# Patient Record
Sex: MALE | Race: WHITE | Employment: FULL TIME | URBAN - METROPOLITAN AREA
[De-identification: names, ages, dates, MRNs, and addresses within clinical notes are randomized per-mention and may not be internally consistent; named-entity substitution may affect disease eponyms.]

---

## 2023-07-18 NOTE — PROGRESS NOTES
Subjective   Patient ID: Jose Richards is a 32 y.o. male who presents for Annual Exam.  Today he is accompanied by alone.     HPI    1. Health maintenance   Up to date on recommended vaccinations  Denies smoking history and hasn't consumed alcohol in over 1 year  Claims to have a well balanced diet and eats more home-cooked meals   Attempts to run for 15 minutes daily and frequently walks weekly   Denies family history of colon cancer or prostate cancer   Denies chest pain, shortness of breath, palpitations, syncope or other cardiopulmonary symptoms   Denies abdominal pain, N/V/D/C, hematochezia, numbness, tingling, myalgia, arthralgia, polydipsia, heat/cold intolerance, polyuria and dysphagia     2. Mood disorder  Mood swings, primarily occurring in winter months, have improved since taking OTC Vitamin D.    Last Vitamin D level improving from 34 to 64 on (6/1/2022)     3. Deviated septum  Stable with pt seeing ENT who deffered surgical intervention at this time.     4. Nevus /birthmark  Pt seen by Derm (Dr. 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   Derm recommending monthly self-skin checks, notifying office for any changes in size, shape, or color.       5. Upper back pain  Continues to have upper back lower neck pain a couple times a month well managed with stretching and exercise.    Evaluated by by Orthopedics (Dr. Lay) on 12/6/2022 who recommended conservative treatment of stretching and regular exercise.   Denies numbness or tingling, changes in bladder habits, or difficulty walking.      6. Cold hands and feet  Stable and related to working in his basement which is colder.   Denies having any skin color changes or pain associated with the cold feet and hands. He is not a smoker. This only occurs during the winter months.     7. Acid reflux  Improved since making dietary and other lifestyle changes with pt no longer having acid reflex.   Endorses occasional, mild bloating associated  "with late night snacking.   Denies belching, nausea, or vomiting.     8. Other hemorrhoids   Twice in the past 1 month, he reports a \"sandpaper\" and \"like there is still something there.\"   Associated small amount of bright red blood on tissue paper upon wiping with these 2 episodes.   Endorses looser stools in morning without mucus.   Denies rectal or prostate pain or straining,     8. Fatigue  Reports mild fatigue.     No current outpatient medications on file prior to visit.     No current facility-administered medications on file prior to visit.        No Known Allergies    Immunization History   Administered Date(s) Administered    DTaP, Unspecified 1988, 02/28/1989, 04/25/1989, 01/09/1990    IPV 1988, 02/28/1989, 04/25/1989, 11/24/1989    Influenza, injectable, MDCK, preservative free, quadrivalent 09/25/2017    Influenza, injectable, quadrivalent, preservative free 11/30/2018, 09/27/2019, 09/14/2020, 09/29/2021, 10/03/2022    MMR 09/04/1989, 02/27/1995    Moderna SARS-CoV-2 Vaccination 01/04/2022    Pfizer Purple Cap SARS-CoV-2 03/29/2021, 04/19/2021    Pfizer Sars-cov-2 Bivalent 30 mcg/0.3 mL 09/23/2022    Tdap 08/29/2016         Review of Systems  All pertinent positive symptoms are included in the history of present illness.  All other systems have been reviewed and are negative and noncontributory to this patient's current ailments.     Objective   /80 (BP Location: Left arm, Patient Position: Sitting, BP Cuff Size: Adult)   Pulse 78   Ht 1.753 m (5' 9\")   Wt 71.5 kg (157 lb 9.6 oz)   BMI 23.27 kg/m²   BSA: 1.87 meters squared  Office Visit on 07/19/2023   Component Date Value Ref Range Status    POC Color, Urine 07/19/2023 Colorless (A)  Straw, Yellow, Light Yellow Final    POC Appearance, Urine 07/19/2023 Clear  Clear Final    POC Specific Gravity, Urine 07/19/2023 <=1.005  1.005 - 1.035 Final    POC PH, Urine 07/19/2023 6.0  No Reference Range Established PH Final    POC Protein, " Urine 07/19/2023 NEGATIVE  NEGATIVE, 30 (1+) mg/dl Final    POC Glucose, Urine 07/19/2023 NEGATIVE  NEGATIVE mg/dl Final    POC Blood, Urine 07/19/2023 NEGATIVE  NEGATIVE Final    POC Ketones, Urine 07/19/2023 NEGATIVE  NEGATIVE mg/dl Final    POC Bilirubin, Urine 07/19/2023 NEGATIVE  NEGATIVE Final    POC Urobilinogen, Urine 07/19/2023 0.2  0.2, 1.0 EU/DL Final    Poc Nitrate, Urine 07/19/2023 NEGATIVE  NEGATIVE Final    POC Leukocytes, Urine 07/19/2023 NEGATIVE  NEGATIVE Final       Physical Exam  CONSTITUTIONAL - well nourished, well developed, looks like stated age, in no acute distress, not ill-appearing, and not tired appearing  SKIN - normal skin color and pigmentation, normal skin turgor without rash, lesions, or nodules visualized. 2 cm nevus on upper right  to midline with defined borders. No erythema, discoloration, warmth, or bleeding  HEAD - no trauma, normocephalic  EYES - pupils are equal and reactive to light, extraocular muscles are intact, and normal external exam  ENT - TM's intact, no injection, no signs of infection, uvula midline, normal tongue movement and throat normal, no exudate, nasal passage without discharge and patent  NECK - supple without rigidity, no neck mass was observed, no thyromegaly or thyroid nodules  CHEST - clear to auscultation, no wheezing, no crackles and no rales, good effort  CARDIAC - regular rate and regular rhythm, no skipped beats, no murmur  ABDOMEN - no organomegaly, soft, nontender, nondistended, normal bowel sounds, no guarding/rebound/rigidity, negative McBurney sign and negative Fierro sign  EXTREMITIES - no edema, no deformities  NEUROLOGICAL - normal gait, normal balance, normal motor, no ataxia, DTRs equal and symmetrical; alert, oriented and no focal signs  PSYCHIATRIC - alert, pleasant and cordial, age-appropriate  IMMUNOLOGIC - no cervical lymphadenopathy     Assessment/Plan   1. Encounter for preventive health examination   Complete history  and physical examination was performed  EKG reveals bradycardia without acute changes  Requisition for CBC, CMP, TSH, lipid, A1c has been provided to you  We will notify of test results once available and make treatment recommendations accordingly    2. Mood disorder  Reviewed improvement in Vitamin D levels from 34 to 64 on (6/1/2022).   Continue taking OTC Vitamin D in addition to calcium-enriched diet.   We will recheck Vitamin D levels today and make further recommendations accordingly.  Revisited association of symptoms in relation to seasonal affective disorder.   We discussed a bright light during the winter, and potentially bupropion if desired when the winter months are coming up.      3. Deviated septum  Well managed and no current issues since ENT consult with Dr. Ajit Oviedo.   Please continue follow-up per ENT protocol for any changes or further assessment as needed.       4. Nevus /birthmark  Reviewed Derm (Dr. Valdez) progress note on 7/7/2022.   Continue monthly self-skin checks to monitor for any changes in moles and follow-up with Derm for any additional concerns.       5. Upper back pain  Stable and managed with regular stretching and exercises per Orthopedic's recommendation  (Dr. Lay) on 12/6/2022.   Continue current stretching and strengthening regiment.   Notify contact the office if your symptoms persist/worsen or if you develop any numbness or tingling.       5. Cold hands and feet  This is most likely situational, related to working from your basement at home where it is cold.   No screening for autoimmune or other underlying etiologies at this time.   Please let me know if symptoms worsen or you notice skin color changes, numbness, or tingling.      6. Acid reflux  Stable and well managed with diet and current vitamin supplementation.   If your symptoms worsen, we can gladly to set provide a referral to gastroenterology for endoscopy.    7. Other hemorrhoids   This is most likely an  internal hemorrhoid.   For now, begin taking sitz baths once daily for the next 2-3 weeks, increase your fiber supplementation, and avoid straining.   If unresolved, I can refer you to colorectal surgery to discuss rubber band ligation.     8. Fatigue  Ordered screening labs including a CBC with ferritin, TSH, and Testosterone.   Since you are fasting today, please complete these labs today.  We will notify you once these lab results return and further recommendations accordingly.

## 2023-07-19 ENCOUNTER — OFFICE VISIT (OUTPATIENT)
Dept: PRIMARY CARE | Facility: CLINIC | Age: 33
End: 2023-07-19
Payer: COMMERCIAL

## 2023-07-19 ENCOUNTER — LAB (OUTPATIENT)
Dept: LAB | Facility: LAB | Age: 33
End: 2023-07-19
Payer: COMMERCIAL

## 2023-07-19 VITALS
BODY MASS INDEX: 23.34 KG/M2 | SYSTOLIC BLOOD PRESSURE: 118 MMHG | HEART RATE: 78 BPM | WEIGHT: 157.6 LBS | HEIGHT: 69 IN | DIASTOLIC BLOOD PRESSURE: 80 MMHG

## 2023-07-19 DIAGNOSIS — D22.9 NEVUS: ICD-10-CM

## 2023-07-19 DIAGNOSIS — Z00.00 HEALTHCARE MAINTENANCE: ICD-10-CM

## 2023-07-19 DIAGNOSIS — R53.83 OTHER FATIGUE: ICD-10-CM

## 2023-07-19 DIAGNOSIS — F39 MOOD DISORDER (CMS-HCC): ICD-10-CM

## 2023-07-19 DIAGNOSIS — J34.2 DEVIATED SEPTUM: ICD-10-CM

## 2023-07-19 DIAGNOSIS — K64.9 BLEEDING HEMORRHOID: ICD-10-CM

## 2023-07-19 DIAGNOSIS — E55.9 VITAMIN D DEFICIENCY: ICD-10-CM

## 2023-07-19 DIAGNOSIS — Z00.00 HEALTHCARE MAINTENANCE: Primary | ICD-10-CM

## 2023-07-19 PROBLEM — E78.00 ELEVATED LDL CHOLESTEROL LEVEL: Status: ACTIVE | Noted: 2023-07-19

## 2023-07-19 LAB
ALANINE AMINOTRANSFERASE (SGPT) (U/L) IN SER/PLAS: 15 U/L (ref 10–52)
ALBUMIN (G/DL) IN SER/PLAS: 5 G/DL (ref 3.4–5)
ALKALINE PHOSPHATASE (U/L) IN SER/PLAS: 49 U/L (ref 33–120)
AMYLASE (U/L) IN SER/PLAS: 42 U/L (ref 29–103)
ANION GAP IN SER/PLAS: 12 MMOL/L (ref 10–20)
ASPARTATE AMINOTRANSFERASE (SGOT) (U/L) IN SER/PLAS: 17 U/L (ref 9–39)
BASOPHILS (10*3/UL) IN BLOOD BY AUTOMATED COUNT: 0.01 X10E9/L (ref 0–0.1)
BASOPHILS/100 LEUKOCYTES IN BLOOD BY AUTOMATED COUNT: 0.3 % (ref 0–2)
BILIRUBIN TOTAL (MG/DL) IN SER/PLAS: 0.7 MG/DL (ref 0–1.2)
CALCIDIOL (25 OH VITAMIN D3) (NG/ML) IN SER/PLAS: 66 NG/ML
CALCIUM (MG/DL) IN SER/PLAS: 9.7 MG/DL (ref 8.6–10.3)
CARBON DIOXIDE, TOTAL (MMOL/L) IN SER/PLAS: 30 MMOL/L (ref 21–32)
CHLORIDE (MMOL/L) IN SER/PLAS: 101 MMOL/L (ref 98–107)
CHOLESTEROL (MG/DL) IN SER/PLAS: 241 MG/DL (ref 0–199)
CHOLESTEROL IN HDL (MG/DL) IN SER/PLAS: 69.3 MG/DL
CHOLESTEROL/HDL RATIO: 3.5
COBALAMIN (VITAMIN B12) (PG/ML) IN SER/PLAS: 322 PG/ML (ref 211–911)
CREATININE (MG/DL) IN SER/PLAS: 1.06 MG/DL (ref 0.5–1.3)
EOSINOPHILS (10*3/UL) IN BLOOD BY AUTOMATED COUNT: 0.08 X10E9/L (ref 0–0.7)
EOSINOPHILS/100 LEUKOCYTES IN BLOOD BY AUTOMATED COUNT: 2.1 % (ref 0–6)
ERYTHROCYTE DISTRIBUTION WIDTH (RATIO) BY AUTOMATED COUNT: 12.6 % (ref 11.5–14.5)
ERYTHROCYTE MEAN CORPUSCULAR HEMOGLOBIN CONCENTRATION (G/DL) BY AUTOMATED: 32.6 G/DL (ref 32–36)
ERYTHROCYTE MEAN CORPUSCULAR VOLUME (FL) BY AUTOMATED COUNT: 94 FL (ref 80–100)
ERYTHROCYTES (10*6/UL) IN BLOOD BY AUTOMATED COUNT: 5.03 X10E12/L (ref 4.5–5.9)
ESTIMATED AVERAGE GLUCOSE FOR HBA1C: 114 MG/DL
FERRITIN (UG/LL) IN SER/PLAS: 150 UG/L (ref 20–300)
GFR MALE: >90 ML/MIN/1.73M2
GLUCOSE (MG/DL) IN SER/PLAS: 90 MG/DL (ref 74–99)
HEMATOCRIT (%) IN BLOOD BY AUTOMATED COUNT: 47.2 % (ref 41–52)
HEMOGLOBIN (G/DL) IN BLOOD: 15.4 G/DL (ref 13.5–17.5)
HEMOGLOBIN A1C/HEMOGLOBIN TOTAL IN BLOOD: 5.6 %
IMMATURE GRANULOCYTES/100 LEUKOCYTES IN BLOOD BY AUTOMATED COUNT: 0 % (ref 0–0.9)
IRON (UG/DL) IN SER/PLAS: 124 UG/DL (ref 35–150)
IRON BINDING CAPACITY (UG/DL) IN SER/PLAS: 318 UG/DL (ref 240–445)
IRON SATURATION (%) IN SER/PLAS: 39 % (ref 25–45)
LDL: 161 MG/DL (ref 0–99)
LEUKOCYTES (10*3/UL) IN BLOOD BY AUTOMATED COUNT: 3.9 X10E9/L (ref 4.4–11.3)
LYMPHOCYTES (10*3/UL) IN BLOOD BY AUTOMATED COUNT: 1.43 X10E9/L (ref 1.2–4.8)
LYMPHOCYTES/100 LEUKOCYTES IN BLOOD BY AUTOMATED COUNT: 37 % (ref 13–44)
MONOCYTES (10*3/UL) IN BLOOD BY AUTOMATED COUNT: 0.26 X10E9/L (ref 0.1–1)
MONOCYTES/100 LEUKOCYTES IN BLOOD BY AUTOMATED COUNT: 6.7 % (ref 2–10)
NEUTROPHILS (10*3/UL) IN BLOOD BY AUTOMATED COUNT: 2.09 X10E9/L (ref 1.2–7.7)
NEUTROPHILS/100 LEUKOCYTES IN BLOOD BY AUTOMATED COUNT: 53.9 % (ref 40–80)
PLATELETS (10*3/UL) IN BLOOD AUTOMATED COUNT: 238 X10E9/L (ref 150–450)
POC APPEARANCE, URINE: CLEAR
POC BILIRUBIN, URINE: NEGATIVE
POC BLOOD, URINE: NEGATIVE
POC COLOR, URINE: COLORLESS
POC GLUCOSE, URINE: NEGATIVE MG/DL
POC KETONES, URINE: NEGATIVE MG/DL
POC LEUKOCYTES, URINE: NEGATIVE
POC NITRITE,URINE: NEGATIVE
POC PH, URINE: 6 PH
POC PROTEIN, URINE: NEGATIVE MG/DL
POC SPECIFIC GRAVITY, URINE: <=1.005
POC UROBILINOGEN, URINE: 0.2 EU/DL
POTASSIUM (MMOL/L) IN SER/PLAS: 4.5 MMOL/L (ref 3.5–5.3)
PROSTATE SPECIFIC AG (NG/ML) IN SER/PLAS: 0.24 NG/ML (ref 0–4)
PROTEIN TOTAL: 7.5 G/DL (ref 6.4–8.2)
SODIUM (MMOL/L) IN SER/PLAS: 138 MMOL/L (ref 136–145)
THYROTROPIN (MIU/L) IN SER/PLAS BY DETECTION LIMIT <= 0.05 MIU/L: 1.28 MIU/L (ref 0.44–3.98)
TRIGLYCERIDE (MG/DL) IN SER/PLAS: 54 MG/DL (ref 0–149)
UREA NITROGEN (MG/DL) IN SER/PLAS: 18 MG/DL (ref 6–23)
VLDL: 11 MG/DL (ref 0–40)

## 2023-07-19 PROCEDURE — 80053 COMPREHEN METABOLIC PANEL: CPT

## 2023-07-19 PROCEDURE — 99214 OFFICE O/P EST MOD 30 MIN: CPT | Performed by: STUDENT IN AN ORGANIZED HEALTH CARE EDUCATION/TRAINING PROGRAM

## 2023-07-19 PROCEDURE — 84443 ASSAY THYROID STIM HORMONE: CPT

## 2023-07-19 PROCEDURE — 84153 ASSAY OF PSA TOTAL: CPT

## 2023-07-19 PROCEDURE — 83036 HEMOGLOBIN GLYCOSYLATED A1C: CPT

## 2023-07-19 PROCEDURE — 93000 ELECTROCARDIOGRAM COMPLETE: CPT | Performed by: STUDENT IN AN ORGANIZED HEALTH CARE EDUCATION/TRAINING PROGRAM

## 2023-07-19 PROCEDURE — 83550 IRON BINDING TEST: CPT

## 2023-07-19 PROCEDURE — 83540 ASSAY OF IRON: CPT

## 2023-07-19 PROCEDURE — 1036F TOBACCO NON-USER: CPT | Performed by: STUDENT IN AN ORGANIZED HEALTH CARE EDUCATION/TRAINING PROGRAM

## 2023-07-19 PROCEDURE — 85025 COMPLETE CBC W/AUTO DIFF WBC: CPT

## 2023-07-19 PROCEDURE — 82607 VITAMIN B-12: CPT

## 2023-07-19 PROCEDURE — 82150 ASSAY OF AMYLASE: CPT

## 2023-07-19 PROCEDURE — 36415 COLL VENOUS BLD VENIPUNCTURE: CPT

## 2023-07-19 PROCEDURE — 84403 ASSAY OF TOTAL TESTOSTERONE: CPT

## 2023-07-19 PROCEDURE — 99395 PREV VISIT EST AGE 18-39: CPT | Performed by: STUDENT IN AN ORGANIZED HEALTH CARE EDUCATION/TRAINING PROGRAM

## 2023-07-19 PROCEDURE — 82728 ASSAY OF FERRITIN: CPT

## 2023-07-19 PROCEDURE — 84402 ASSAY OF FREE TESTOSTERONE: CPT

## 2023-07-19 PROCEDURE — 82306 VITAMIN D 25 HYDROXY: CPT

## 2023-07-19 PROCEDURE — 80061 LIPID PANEL: CPT

## 2023-07-19 PROCEDURE — 81002 URINALYSIS NONAUTO W/O SCOPE: CPT | Performed by: STUDENT IN AN ORGANIZED HEALTH CARE EDUCATION/TRAINING PROGRAM

## 2023-07-19 NOTE — RESULT ENCOUNTER NOTE
Cholesterol noted to be elevated once again at 241, HDL 69, , triglycerides 54    Complete blood cell count shows no anemia but did show a slightly low white blood cell count at 3.9 but I am not concerned    Hemoglobin A1c well within normal limits    Vitamin D well within normal limits alongside B12    Thyroid well within normal limits alongside his prostate    Sugar, kidneys, liver, electrodes are all within normal limits    Iron and TIBC are all within normal limits as well as ferritin    Amylase within normal limits and we will continue waiting for the free and total testosterone at this time

## 2023-07-25 ENCOUNTER — TELEPHONE (OUTPATIENT)
Dept: PRIMARY CARE | Facility: CLINIC | Age: 33
End: 2023-07-25
Payer: COMMERCIAL

## 2023-07-25 LAB
TESTOSTERONE FREE (CHAN): 63.9 PG/ML (ref 35–155)
TESTOSTERONE,TOTAL,LC-MS/MS: 507 NG/DL (ref 250–1100)

## 2023-07-25 NOTE — TELEPHONE ENCOUNTER
----- Message from Khris Jones DO sent at 7/25/2023 12:44 PM EDT -----  Testosterone free and total are well within normal limits at 63.9 and 507 respectively

## 2023-08-15 ENCOUNTER — TELEPHONE (OUTPATIENT)
Dept: PRIMARY CARE | Facility: CLINIC | Age: 33
End: 2023-08-15
Payer: COMMERCIAL

## 2023-08-15 NOTE — TELEPHONE ENCOUNTER
----- Message from Khris Jones DO sent at 8/15/2023 12:44 PM EDT -----  I would recommend diet and exercise at this time    We will closely monitor and at this point since he is young and healthy we do not fully need to start cholesterol-lowering medication unless his LDL reaches 190 or above    Thank you    If it would help, he could take over-the-counter fish oil to see if this would be of any benefit and we can recheck his lab work next year  ----- Message -----  From: Alannah Dewitt MA  Sent: 8/15/2023  11:29 AM EDT  To: Khris Jones DO

## 2023-08-15 NOTE — TELEPHONE ENCOUNTER
Pt wants to know how you would like to go forward in regards to his elevated cholesterol, treatment options, next steps etc?

## 2023-09-18 DIAGNOSIS — K64.9 HEMORRHOIDS, UNSPECIFIED HEMORRHOID TYPE: ICD-10-CM

## 2023-10-19 ENCOUNTER — TELEPHONE (OUTPATIENT)
Dept: SURGERY | Facility: CLINIC | Age: 33
End: 2023-10-19
Payer: COMMERCIAL

## 2023-10-19 DIAGNOSIS — K62.5 RECTAL BLEEDING: ICD-10-CM

## 2023-10-19 NOTE — TELEPHONE ENCOUNTER
Patient called with concerns of continued bleeding with BM's. His bleeding varies from bright red blood on the TP to darker blood in the toilet. Dr. Taylor aware and per her last office note if patient continues to have bleeding then colonoscopy recommended. Colonoscopy order placed and patient aware.

## 2023-11-03 DIAGNOSIS — K62.5 BRIGHT RED BLOOD PER RECTUM: ICD-10-CM

## 2023-11-03 RX ORDER — POLYETHYLENE GLYCOL 3350, SODIUM SULFATE ANHYDROUS, SODIUM BICARBONATE, SODIUM CHLORIDE, POTASSIUM CHLORIDE 236; 22.74; 6.74; 5.86; 2.97 G/4L; G/4L; G/4L; G/4L; G/4L
4000 POWDER, FOR SOLUTION ORAL ONCE
Qty: 4000 ML | Refills: 0 | Status: SHIPPED | OUTPATIENT
Start: 2023-11-03 | End: 2023-11-03

## 2023-11-16 ENCOUNTER — OFFICE VISIT (OUTPATIENT)
Dept: GASTROENTEROLOGY | Facility: EXTERNAL LOCATION | Age: 33
End: 2023-11-16
Payer: COMMERCIAL

## 2023-11-16 DIAGNOSIS — K62.5 RECTAL BLEEDING: ICD-10-CM

## 2023-11-16 DIAGNOSIS — K64.8 OTHER HEMORRHOIDS: Primary | ICD-10-CM

## 2023-11-16 PROCEDURE — 45378 DIAGNOSTIC COLONOSCOPY: CPT | Performed by: INTERNAL MEDICINE

## 2023-11-16 PROCEDURE — 1036F TOBACCO NON-USER: CPT | Performed by: INTERNAL MEDICINE

## 2023-12-04 ENCOUNTER — APPOINTMENT (OUTPATIENT)
Dept: LAB | Facility: LAB | Age: 33
End: 2023-12-04
Payer: COMMERCIAL

## 2023-12-04 ENCOUNTER — OFFICE VISIT (OUTPATIENT)
Dept: UROLOGY | Facility: HOSPITAL | Age: 33
End: 2023-12-04
Payer: COMMERCIAL

## 2023-12-04 DIAGNOSIS — N43.2 OTHER HYDROCELE: Primary | ICD-10-CM

## 2023-12-04 DIAGNOSIS — R39.9 LOWER URINARY TRACT SYMPTOMS (LUTS): ICD-10-CM

## 2023-12-04 PROBLEM — N43.3 HYDROCELE: Status: ACTIVE | Noted: 2023-12-04

## 2023-12-04 LAB
APPEARANCE UR: CLEAR
BILIRUB UR STRIP.AUTO-MCNC: NEGATIVE MG/DL
COLOR UR: COLORLESS
GLUCOSE UR STRIP.AUTO-MCNC: NEGATIVE MG/DL
KETONES UR STRIP.AUTO-MCNC: NEGATIVE MG/DL
LEUKOCYTE ESTERASE UR QL STRIP.AUTO: NEGATIVE
NITRITE UR QL STRIP.AUTO: NEGATIVE
PH UR STRIP.AUTO: 6 [PH]
PROT UR STRIP.AUTO-MCNC: NEGATIVE MG/DL
RBC # UR STRIP.AUTO: NEGATIVE /UL
SP GR UR STRIP.AUTO: 1.01
UROBILINOGEN UR STRIP.AUTO-MCNC: ABNORMAL MG/DL

## 2023-12-04 PROCEDURE — 81003 URINALYSIS AUTO W/O SCOPE: CPT

## 2023-12-04 PROCEDURE — 99214 OFFICE O/P EST MOD 30 MIN: CPT | Performed by: UROLOGY

## 2023-12-04 PROCEDURE — 1036F TOBACCO NON-USER: CPT | Performed by: UROLOGY

## 2023-12-04 PROCEDURE — 87086 URINE CULTURE/COLONY COUNT: CPT

## 2023-12-04 NOTE — PROGRESS NOTES
"Last visit 11/2022  #sp hydrocelectomy  -well healed, hydrocele improved    Today's visit:  33 yo M with hx [place] here for hydrocele      #Hydrocele  -sp left hydrocelectomy 6/17/22  -no pain, fevers, or chills  -hydrocele improved  -path benign  -nonsmoker     -has 4 kids, 2 boys, 2 girls, (6yo, 6yo, 2yo, 6m)    #frequent urination /prostate ca screening  -frequent, lots of fluid  -no hematuria /dysuria  -some nocturia        #decreased lbido / thinks ejaculation is not as strong      -mild decrease          Labs  T normal in July  No results found for: \"LH\"  No results found for: \"FSH\"  No components found for: \"ESTRADIAL\"  Lab Results   Component Value Date    PSA 0.24 07/19/2023     No components found for: \"CBC\"  No results found for: \"PROLACTIN\"  Lab Results   Component Value Date    HGBA1C 5.6 07/19/2023         PMH:  No past medical history on file.     PSH:  No past surgical history on file.     Medications:  No current outpatient medications on file.    Allergy:  No Known Allergies     Exam  CONSTITUTIONAL:        No acute distress    HEAD:        Normocephalic and atraumatic    CHEST / RESPIRATORY      no excess work of breathing, no respiratory distress,    ABDOMEN / GASTROINTESTINAL:        Abdomen nondistended    Testicles descended bilaterally, nontender, no masses  Vasa palpable bilaterally  Penis circ'd, no lesions, no plaques        Assessment/Plan  #sp hydrocelectomy  -well healed, hydrocele improved    #decreased libido, normal T  -Dr. Jasmine     #LUTS , high volume frequency  -discussed fluid restriction  -urinalysis/culture today   -discussed daily cialis, pt declined for now    Fu with me prn      "

## 2023-12-05 LAB — BACTERIA UR CULT: NO GROWTH

## 2023-12-15 NOTE — PROGRESS NOTES
NAY Richards is a 33 y.o. male who was initially seen 9/2023 for pain with defecation and occasional blood with wiping that began in June. On exam he had a superficial posterior anal fissure. He continued to have bleeding so colonoscopy was performed and was negative. He presents today for follow up.     He is drinking 8 glasses of water per day. Taking metamucil. Stool is soft and he is not straining. Spending minimal time on the toilet to have a BM.     Colonoscopy 11/16/2023 ([facility]): The examination of the ileum was normal. Small external and internal hemorrhoids. The exam was otherwise normal. No specimens collected. Repeat in 10 years.     No past medical history on file.    No past surgical history on file.    No Known Allergies    Review of Systems   Gastrointestinal:  Positive for anal bleeding and blood in stool.   All other systems reviewed and are negative.      Physical Exam  Constitutional:       Appearance: Normal appearance.   HENT:      Head: Normocephalic and atraumatic.   Pulmonary:      Effort: Pulmonary effort is normal.   Musculoskeletal:         General: Normal range of motion.   Skin:     General: Skin is warm and dry.   Neurological:      General: No focal deficit present.      Mental Status: He is alert and oriented to person, place, and time. Mental status is at baseline.   Psychiatric:         Mood and Affect: Mood normal.         Behavior: Behavior normal.         Thought Content: Thought content normal.     Anoscopy    Date/Time: 12/22/2023 9:24 AM    Performed by: Annemarie Crawford MD  Authorized by: Annemarie Crawford MD    Consent:     Consent obtained:  Verbal    Consent given by:  Patient    Risks, benefits, and alternatives were discussed: yes      Risks discussed:  Bleeding and pain  Universal protocol:     Procedure explained and questions answered to patient or proxy's satisfaction: yes      Patient identity confirmed:  Verbally with  patient  Indications:     Indications: rectal bleeding    Post-procedure details:     Procedure completion:  Tolerated well, no immediate complications      Assessment and Plan:   Improved symptoms. Almost no pain, very occasional spotting.   Improved posterior midline fissure. Hypertonic sphincter. Levator pain on exam.     Will add nifepdipine.

## 2023-12-19 ENCOUNTER — APPOINTMENT (OUTPATIENT)
Dept: SURGERY | Facility: CLINIC | Age: 33
End: 2023-12-19
Payer: COMMERCIAL

## 2023-12-22 ENCOUNTER — OFFICE VISIT (OUTPATIENT)
Dept: SURGERY | Facility: CLINIC | Age: 33
End: 2023-12-22
Payer: COMMERCIAL

## 2023-12-22 VITALS
WEIGHT: 154 LBS | BODY MASS INDEX: 22.74 KG/M2 | SYSTOLIC BLOOD PRESSURE: 122 MMHG | DIASTOLIC BLOOD PRESSURE: 81 MMHG | HEART RATE: 88 BPM

## 2023-12-22 DIAGNOSIS — K60.2 ANAL FISSURE: ICD-10-CM

## 2023-12-22 PROCEDURE — 46600 DIAGNOSTIC ANOSCOPY SPX: CPT | Performed by: SURGERY

## 2023-12-22 PROCEDURE — 99213 OFFICE O/P EST LOW 20 MIN: CPT | Performed by: SURGERY

## 2023-12-22 PROCEDURE — 1036F TOBACCO NON-USER: CPT | Performed by: SURGERY

## 2023-12-22 ASSESSMENT — ENCOUNTER SYMPTOMS
RECTAL BLEEDING: 1
ANAL BLEEDING: 1
BLOOD IN STOOL: 1

## 2024-02-06 DIAGNOSIS — K60.2 ANAL FISSURE: ICD-10-CM

## 2024-02-15 ENCOUNTER — LAB (OUTPATIENT)
Dept: LAB | Facility: LAB | Age: 34
End: 2024-02-15
Payer: COMMERCIAL

## 2024-02-15 ENCOUNTER — OFFICE VISIT (OUTPATIENT)
Dept: PRIMARY CARE | Facility: CLINIC | Age: 34
End: 2024-02-15
Payer: COMMERCIAL

## 2024-02-15 VITALS
BODY MASS INDEX: 23.99 KG/M2 | SYSTOLIC BLOOD PRESSURE: 108 MMHG | WEIGHT: 162 LBS | HEIGHT: 69 IN | DIASTOLIC BLOOD PRESSURE: 80 MMHG | HEART RATE: 83 BPM

## 2024-02-15 DIAGNOSIS — R53.83 OTHER FATIGUE: ICD-10-CM

## 2024-02-15 DIAGNOSIS — E78.2 MIXED HYPERLIPIDEMIA: Primary | ICD-10-CM

## 2024-02-15 DIAGNOSIS — E55.9 VITAMIN D DEFICIENCY: ICD-10-CM

## 2024-02-15 DIAGNOSIS — K64.9 BLEEDING HEMORRHOID: ICD-10-CM

## 2024-02-15 DIAGNOSIS — R10.9 ABDOMINAL DISCOMFORT: ICD-10-CM

## 2024-02-15 DIAGNOSIS — F39 MOOD DISORDER (CMS-HCC): ICD-10-CM

## 2024-02-15 DIAGNOSIS — D22.9 NEVUS: ICD-10-CM

## 2024-02-15 DIAGNOSIS — E78.2 MIXED HYPERLIPIDEMIA: ICD-10-CM

## 2024-02-15 DIAGNOSIS — J34.2 DEVIATED SEPTUM: ICD-10-CM

## 2024-02-15 LAB
ALBUMIN SERPL BCP-MCNC: 4.7 G/DL (ref 3.4–5)
ALP SERPL-CCNC: 51 U/L (ref 33–120)
ALT SERPL W P-5'-P-CCNC: 15 U/L (ref 10–52)
ANION GAP SERPL CALC-SCNC: 11 MMOL/L (ref 10–20)
AST SERPL W P-5'-P-CCNC: 18 U/L (ref 9–39)
BILIRUB SERPL-MCNC: 0.6 MG/DL (ref 0–1.2)
BUN SERPL-MCNC: 24 MG/DL (ref 6–23)
CALCIUM SERPL-MCNC: 9 MG/DL (ref 8.6–10.3)
CHLORIDE SERPL-SCNC: 104 MMOL/L (ref 98–107)
CHOLEST SERPL-MCNC: 202 MG/DL (ref 0–199)
CHOLESTEROL/HDL RATIO: 3.5
CO2 SERPL-SCNC: 28 MMOL/L (ref 21–32)
CREAT SERPL-MCNC: 1.02 MG/DL (ref 0.5–1.3)
EGFRCR SERPLBLD CKD-EPI 2021: >90 ML/MIN/1.73M*2
EST. AVERAGE GLUCOSE BLD GHB EST-MCNC: 111 MG/DL
GLUCOSE SERPL-MCNC: 71 MG/DL (ref 74–99)
HBA1C MFR BLD: 5.5 %
HDLC SERPL-MCNC: 57.5 MG/DL
LDLC SERPL CALC-MCNC: 135 MG/DL
NON HDL CHOLESTEROL: 145 MG/DL (ref 0–149)
POTASSIUM SERPL-SCNC: 4.2 MMOL/L (ref 3.5–5.3)
PROT SERPL-MCNC: 7.3 G/DL (ref 6.4–8.2)
SODIUM SERPL-SCNC: 139 MMOL/L (ref 136–145)
TRIGL SERPL-MCNC: 46 MG/DL (ref 0–149)
VLDL: 9 MG/DL (ref 0–40)

## 2024-02-15 PROCEDURE — 99214 OFFICE O/P EST MOD 30 MIN: CPT | Performed by: STUDENT IN AN ORGANIZED HEALTH CARE EDUCATION/TRAINING PROGRAM

## 2024-02-15 PROCEDURE — 1036F TOBACCO NON-USER: CPT | Performed by: STUDENT IN AN ORGANIZED HEALTH CARE EDUCATION/TRAINING PROGRAM

## 2024-02-15 PROCEDURE — 80061 LIPID PANEL: CPT

## 2024-02-15 PROCEDURE — 80053 COMPREHEN METABOLIC PANEL: CPT

## 2024-02-15 PROCEDURE — 36415 COLL VENOUS BLD VENIPUNCTURE: CPT

## 2024-02-15 PROCEDURE — 83036 HEMOGLOBIN GLYCOSYLATED A1C: CPT

## 2024-02-15 NOTE — PROGRESS NOTES
Subjective   Patient ID: Jose Richards is a 33 y.o. male who presents for 6 Month Follow Up.  Today he is accompanied by alone.     HPI    1.  Hyperlipidemia  At his last physical his cholesterol noted to be elevated to 241, HDL 63, , triglycerides 54  He has been exercising regularly, about 20-30 minutes daily  Also reports eating a well-balanced diet, one that is sugar-free and with more protein  Denies vision changes, shortness of breath, chest pain, palpitations, edema   Ultimately wishes to discuss this further and fasting in preparation have lab work performed today      2.  Acid reflux/General Abdominal Pain  Reports occasional acid reflux and general abdominal pain with certain foods   Denies nausea, vomiting  Wishes to have an ultrasound focused on liver, gallbladder, and pancreas     3. Immunizations  Patient wishes update hepatitis vaccinations at today's visit    Current Outpatient Medications on File Prior to Visit   Medication Sig Dispense Refill    NIFEdipine (bulk) 0.2 % in ointment base no.104 (bulk) Apply to the affected area twice daily 60 mL 2    NIFEdipine (bulk) powder Nifedipine ointment- 0.2% apply to affected area topically twice daily 60 mL 2     No current facility-administered medications on file prior to visit.        No Known Allergies    Immunization History   Administered Date(s) Administered    DTaP, Unspecified 1988, 02/28/1989, 04/25/1989, 01/09/1990    Flu vaccine (IIV4), preservative free *Check age/dose* 11/30/2018, 09/27/2019, 09/14/2020, 09/29/2021, 10/03/2022    Flu vaccine, quadrivalent, no egg protein, age 6 month or greater (FLUCELVAX) 09/25/2017    Influenza, seasonal, injectable 12/01/2023    MMR vaccine, subcutaneous (MMR II) 09/04/1989, 02/27/1995    Moderna SARS-CoV-2 Booster 12/01/2023    Moderna SARS-CoV-2 Vaccination 01/04/2022    Pfizer COVID-19 vaccine, bivalent, age 12 years and older (30 mcg/0.3 mL) 09/23/2022    Pfizer Purple Cap SARS-CoV-2  "03/29/2021, 04/19/2021    Poliovirus vaccine, subcutaneous (IPOL) 1988, 02/28/1989, 04/25/1989, 11/24/1989    Tdap vaccine, age 7 year and older (BOOSTRIX, ADACEL) 08/29/2016         Review of Systems  All pertinent positive symptoms are included in the history of present illness.  All other systems have been reviewed and are negative and noncontributory to this patient's current ailments.     Objective   /80 (BP Location: Left arm, Patient Position: Sitting, BP Cuff Size: Adult)   Pulse 83   Ht 1.753 m (5' 9\")   Wt 73.5 kg (162 lb)   BMI 23.92 kg/m²   BSA: 1.89 meters squared      Physical Exam  CONSTITUTIONAL - well nourished, well developed, looks like stated age, in no acute distress, not ill-appearing, and not tired appearing  SKIN - normal skin color and pigmentation, normal skin turgor without rash, lesions, or nodules visualized  HEAD - no trauma, normocephalic  EYES - normal external exam  CHEST -no distressed breathing, good effort  CARDIAC - normal rate and rhythm without murmurs, rubs, gallops  EXTREMITIES - no edema, no deformities  PSYCHIATRIC - alert, pleasant and cordial, age-appropriate    Assessment/Plan   1.  Hyperlipidemia  Fasting lab work ordered after today's visit  Will notify the results and make recommendations accordingly  Continue to eat a well-balanced diet and exercise regularly    2. Acid reflux/Abdominal Pain  Stable and well managed with diet and current vitamin supplementation.   Ordered ultrasound to assess liver, gallbladder, pancreas  If your symptoms worsen, we can gladly to set provide a referral to gastroenterology for endoscopy.    3. Immunizations   Unfortunately we do not have hepatitis A/B within the office  I recommend you go to the nearest pharmacy to have this provided to you at your earliest convenience    Please follow-up in 6 months for continued care and/or your physical examinations yearly    "

## 2024-02-16 NOTE — RESULT ENCOUNTER NOTE
Cholesterol noted much improvement down to 202, HDL 57, , triglycerides 46    Keep up the great work    Sugar a few points low but this is actually great  Otherwise liver, kidneys, electrolytes are all within normal limits    Hemoglobin A1c well within normal is at 5.5%

## 2024-02-20 ENCOUNTER — HOSPITAL ENCOUNTER (OUTPATIENT)
Dept: RADIOLOGY | Facility: CLINIC | Age: 34
Discharge: HOME | End: 2024-02-20
Payer: COMMERCIAL

## 2024-02-20 DIAGNOSIS — R10.9 ABDOMINAL DISCOMFORT: ICD-10-CM

## 2024-02-20 PROCEDURE — 76705 ECHO EXAM OF ABDOMEN: CPT | Performed by: RADIOLOGY

## 2024-02-20 PROCEDURE — 76700 US EXAM ABDOM COMPLETE: CPT

## 2024-02-20 NOTE — RESULT ENCOUNTER NOTE
Ultrasound is within normal limits and it did show a small right kidney cyst  I am not concerned about this    Otherwise gallbladder, liver, and pancreas are all within normal limits and unremarkable

## 2024-04-24 ENCOUNTER — OFFICE VISIT (OUTPATIENT)
Dept: GASTROENTEROLOGY | Facility: CLINIC | Age: 34
End: 2024-04-24
Payer: COMMERCIAL

## 2024-04-24 VITALS — HEART RATE: 80 BPM | HEIGHT: 69 IN | WEIGHT: 167 LBS | BODY MASS INDEX: 24.73 KG/M2

## 2024-04-24 DIAGNOSIS — R10.13 EPIGASTRIC ABDOMINAL PAIN: Primary | ICD-10-CM

## 2024-04-24 DIAGNOSIS — R12 HEARTBURN: ICD-10-CM

## 2024-04-24 DIAGNOSIS — R10.13 DYSPEPSIA: ICD-10-CM

## 2024-04-24 DIAGNOSIS — R10.9 ABDOMINAL DISCOMFORT: ICD-10-CM

## 2024-04-24 PROCEDURE — 1036F TOBACCO NON-USER: CPT | Performed by: INTERNAL MEDICINE

## 2024-04-24 PROCEDURE — 99214 OFFICE O/P EST MOD 30 MIN: CPT | Performed by: INTERNAL MEDICINE

## 2024-04-24 ASSESSMENT — ENCOUNTER SYMPTOMS: SHORTNESS OF BREATH: 0

## 2024-04-24 NOTE — PROGRESS NOTES
REASON FOR VISIT:  Abdominal pain  PCP (requesting provider): Khris Jones DO.    HPI:  Jose Richards is a 33 y.o. male being evaluated for heartburn and epigastric abdominal pain. Abdominal U/S unremarkable (2/2024). Colonoscopy showed hemorrhoids (11/2023).    Patient reports he had a few episodes of heartburn and epigastric abdominal pain that would radiate across the top of his abdomen. This pain would occur intermittently for a few days at a time. He tried changing diet with some improvement but then symptoms recurred. Pain felt like pressure and dull pain. He had the ultrasound that was unrevealing. Denies dysphagia or odynophagia. No regular NSAIDs. Colonoscopy was last year. No prior EGD. No blood thinners.    PSurgHx: No abdominal surgeries     FamHx: No GI cancer     Prior Endoscopy:  -Colonoscopy (11/2023): Excellent prep, normal TI, small IH/EH, otherwise normal colon.    PAST MEDICAL HISTORY  No past medical history on file.    PAST SURGICAL HISTORY  No past surgical history on file.    FAMILY HISTORY  No family history on file.    SOCIAL HISTORY   reports that he has never smoked. He has never used smokeless tobacco. He reports that he does not currently use alcohol. He reports that he does not use drugs.    REVIEW OF SYSTEMS  Review of Systems   Respiratory:  Negative for shortness of breath.    Cardiovascular:  Negative for chest pain.   All other systems reviewed and are negative.    A 10+ point review of systems was otherwise negative except as noted and per HPI.    ALLERGIES  No Known Allergies    MEDICATIONS  Current Outpatient Medications   Medication Instructions    NIFEdipine (bulk) 0.2 % in ointment base no.104 (bulk) Apply to the affected area twice daily    NIFEdipine (bulk) powder Nifedipine ointment- 0.2% apply to affected area topically twice daily       VITALS  Vitals:    04/24/24 1458   Pulse: 80      Body mass index is 24.66 kg/m².    PHYSICAL EXAM  CONSTITUTIONAL: NAD,  appears stated age  EYES: anicteric sclera, sclera clear  HEAD: normocephalic, atraumatic   NECK: supple   PULMONARY: CTAB  CARDIOVASCULAR: RRR, no M/R/G appreciated   ABDOMEN: soft, NTND, +BS, no rebound or guarding   MUSCULOSKELETAL: no edema  SKIN: no jaundice   PSYCHIATRIC: AOx3, appropriate insight and judgement    LABS  WBC   Date Value   07/19/2023 3.9 x10E9/L (L)   06/01/2022 5.1 x10E9/L   05/20/2022 7.6 K/UL     Hemoglobin   Date Value   07/19/2023 15.4 g/dL   06/01/2022 15.0 g/dL   05/20/2022 15.5 GM/DL     Platelets   Date Value   07/19/2023 238 x10E9/L   06/01/2022 253 x10E9/L   05/20/2022 257 K/UL     Sodium   Date Value   02/15/2024 139 mmol/L   07/19/2023 138 mmol/L   06/01/2022 143 mmol/L   05/20/2022 144 MMOL/L     Potassium   Date Value   02/15/2024 4.2 mmol/L   07/19/2023 4.5 mmol/L   06/01/2022 4.0 mmol/L   05/20/2022 3.7 MMOL/L     Chloride   Date Value   02/15/2024 104 mmol/L   07/19/2023 101 mmol/L   06/01/2022 105 mmol/L   05/20/2022 104 MMOL/L     Bicarbonate   Date Value   02/15/2024 28 mmol/L   07/19/2023 30 mmol/L   06/01/2022 31 mmol/L   05/20/2022 30 MMOL/L     Urea Nitrogen   Date Value   02/15/2024 24 mg/dL (H)   07/19/2023 18 mg/dL   06/01/2022 15 mg/dL   05/20/2022 20 MG/DL     Creatinine   Date Value   02/15/2024 1.02 mg/dL   07/19/2023 1.06 mg/dL   06/01/2022 1.10 mg/dL   05/20/2022 1.2 MG/DL     Calcium   Date Value   02/15/2024 9.0 mg/dL   07/19/2023 9.7 mg/dL   06/01/2022 9.0 mg/dL   05/20/2022 9.5 MG/DL     Total Protein (g/dL)   Date Value   02/15/2024 7.3   07/19/2023 7.5   06/01/2022 7.2   11/29/2021 7.0     Bilirubin, Total (mg/dL)   Date Value   02/15/2024 0.6     Total Bilirubin (mg/dL)   Date Value   07/19/2023 0.7   06/01/2022 0.7   11/29/2021 0.3     Alkaline Phosphatase (U/L)   Date Value   02/15/2024 51   07/19/2023 49   06/01/2022 45   11/29/2021 41     ALT (U/L)   Date Value   02/15/2024 15     ALT (SGPT) (U/L)   Date Value   07/19/2023 15   06/01/2022 15  "  11/29/2021 21     AST (U/L)   Date Value   02/15/2024 18   07/19/2023 17   06/01/2022 16   11/29/2021 20     Glucose   Date Value   02/15/2024 71 mg/dL (L)   07/19/2023 90 mg/dL   06/01/2022 84 mg/dL   05/20/2022 111 MG/DL (H)     No results found for: \"LIPASE\", \"CRP\"    ASSESSMENT/PLAN  Jose Richards is a 33 y.o. male being evaluated for heartburn and epigastric abdominal pain. Abdominal U/S unremarkable (2/2024). Colonoscopy showed hemorrhoids (11/2023). Patient having intermittent and persistent epigastric abdominal pain and dyspepsia. Differential includes PUD, gastritis, H. Pylori, and functional dyspepsia.    -Plan for EGD including biopsies for H. Pylori if otherwise normal   -The procedure(s) including risks/benefits, diet restrictions, prep, and sedation were discussed with the patient  -If EGD unrevealing, then consider CT scan as a future step in evaluation     Follow-up will be at the time of the EGD.    Signature: Marcial May MD  "

## 2024-05-23 ENCOUNTER — OFFICE VISIT (OUTPATIENT)
Dept: GASTROENTEROLOGY | Facility: EXTERNAL LOCATION | Age: 34
End: 2024-05-23
Payer: COMMERCIAL

## 2024-05-23 DIAGNOSIS — R10.13 DYSPEPSIA: ICD-10-CM

## 2024-05-23 DIAGNOSIS — R10.10 UPPER ABDOMINAL PAIN, UNSPECIFIED: Primary | ICD-10-CM

## 2024-05-23 DIAGNOSIS — R12 HEARTBURN: ICD-10-CM

## 2024-05-23 DIAGNOSIS — R10.13 EPIGASTRIC ABDOMINAL PAIN: ICD-10-CM

## 2024-05-23 PROCEDURE — 88305 TISSUE EXAM BY PATHOLOGIST: CPT

## 2024-05-23 PROCEDURE — 43239 EGD BIOPSY SINGLE/MULTIPLE: CPT | Performed by: INTERNAL MEDICINE

## 2024-05-23 PROCEDURE — 0753T DGTZ GLS MCRSCP SLD LEVEL IV: CPT

## 2024-05-28 ENCOUNTER — LAB REQUISITION (OUTPATIENT)
Dept: LAB | Facility: HOSPITAL | Age: 34
End: 2024-05-28
Payer: COMMERCIAL

## 2024-06-03 LAB
LABORATORY COMMENT REPORT: NORMAL
PATH REPORT.FINAL DX SPEC: NORMAL
PATH REPORT.GROSS SPEC: NORMAL
PATH REPORT.RELEVANT HX SPEC: NORMAL
PATH REPORT.TOTAL CANCER: NORMAL

## 2024-06-10 ENCOUNTER — TELEPHONE (OUTPATIENT)
Dept: GASTROENTEROLOGY | Facility: CLINIC | Age: 34
End: 2024-06-10
Payer: COMMERCIAL

## 2024-06-10 NOTE — TELEPHONE ENCOUNTER
Called and spoke to patient to let him know regarding benign pathology results from his EGD on 5/23/24.    A. DUODENUM, BULB/SECOND PART,  BIOPSY:   -- Small intestinal mucosa with no significant pathologic findings.  --No microscopic evidence of celiac sprue.     B. STOMACH, ANTRUM, BIOPSY:   --Gastric mucosa with evidence of chemical/reactive gastropathy.  --No morphologic evidence of Helicobacter pylori microorganisms.    Feeling ok. Occasional heartburn symptoms after eating and will trial over the counter Pepcid (Famotidine) 20 mg daily as needed. He will call the office for higher dose if still having issues.

## 2024-08-15 NOTE — PROGRESS NOTES
Subjective   Patient ID: Jose Richards is a 33 y.o. male who presents for Annual Exam.  Today he is accompanied by alone.     HPI    1. Health maintenance   Overall patient is doing well.   Immunization: Tdap 6/2018  Influenza vaccine yearly   UTD with other immunization  COVID-19 vaccine (Pfizer Moderna) 5 doses:  Colon Cancer Screening: No family history, colonoscopy done in 11/2023 showed only hemorrhoids, will resume screening for CC at age 43   Diet: Balanced diet, reduced carbs intake  Exercise: Exercise daily, with cardio and running every other day, and weightlifting every other day  Tobacco: Denies use  EtOH: Rarely Socially    Denies family history of colon cancer or prostate cancer   Denies chest pain, shortness of breath, palpitations, syncope, but admits of having some unconventional symptoms as listed below  Denies abdominal pain, N/V/D/C, hematochezia, numbness, tingling, myalgia, arthralgia, polydipsia, heat/cold intolerance, polyuria and dysphagia     2. Mood disorder  Mood swings, primarily occurring in winter months, have improved since taking OTC Vitamin D.    Last Vitamin D level improving from 34 to 64 on (6/1/2022)     3. Deviated septum  Stable with pt seeing ENT who deffered surgical intervention at this time.     4. Nevus /birthmark  Pt seen by Derm (Dr. 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   Derm recommending monthly self-skin checks, notifying office for any changes in size, shape, or color.       5. Hyperlipidemia  Patient has a hx of elevated Chol and LDL which has been managed with diet and exercise.  He has intensified the efforts and during the last lipid panel in 2/2024 his Cholesterol noted much improvement down to 202, HDL 57, , triglycerides 46.  Also reports eating a well-balanced diet, one that is sugar-free and with more protein  Denies vision changes, shortness of breath, chest pain, palpitations, edema       6. Cold hands and feet  Stable and  related to working in his basement which is colder.   Denies having any skin color changes or pain associated with the cold feet and hands. He is not a smoker.   This only occurs during the winter months.     7. Acid reflux /General Abdominal Pain  Reports occasional acid reflux and general abdominal pain with certain foods   Denies nausea, vomiting  Saw GI Dr. May in May 2024. EGD with biopsy was unremarkable and negative for H. Pylori.  Continues Famotidine  20 mg daily    8.  Kidney cyst  Kidney ultrasound performed in February 2024 showed: The right kidney measures 9.0 in length. The renal cortical echogenicity and thickness are within normal limit.  Patient is requesting to evaluate the other kidney, and monitor the right kidney cyst.    Patient is inquiring about Appropriate diet, but denies any current  symptoms.    9.  Chest pressure  Patient admits that occasionally while running for 20 minutes he has a sensation of chest pressure.  Denies any shortness of breath, or the classical chest pain radiated to the left arm or left jaw, but patient is worried about the symptoms and wants to investigate further.  There is no previous history of heart conditions.  Blood pressure today in office is 110/70, heart rate of 71    Current Outpatient Medications on File Prior to Visit   Medication Sig Dispense Refill    NIFEdipine (bulk) 0.2 % in ointment base no.104 (bulk) Apply to the affected area twice daily 60 mL 2    NIFEdipine (bulk) powder Nifedipine ointment- 0.2% apply to affected area topically twice daily 60 mL 2     No current facility-administered medications on file prior to visit.        No Known Allergies    Immunization History   Administered Date(s) Administered    DTaP vaccine, pediatric  (INFANRIX) 1988, 02/28/1989, 04/25/1989, 01/09/1990    DTaP, Unspecified 1988, 02/28/1989, 04/25/1989, 01/09/1990    Flu vaccine (IIV4), preservative free *Check age/dose* 11/30/2018, 09/27/2019,  "09/14/2020, 09/29/2021, 10/03/2022, 12/01/2023    Flu vaccine, quadrivalent, no egg protein, age 6 month or greater (FLUCELVAX) 09/25/2017    Influenza, Unspecified 10/03/2022    Influenza, seasonal, injectable 12/01/2023    MMR vaccine, subcutaneous (MMR II) 09/04/1989, 02/27/1995    Moderna COVID-19 vaccine, Fall 2023, 12 yeasrs and older (50mcg/0.5mL) 12/01/2023    Moderna SARS-CoV-2 Vaccination 01/04/2022    Pfizer COVID-19 vaccine, bivalent, age 12 years and older (30 mcg/0.3 mL) 09/23/2022    Pfizer Purple Cap SARS-CoV-2 03/29/2021, 04/19/2021, 09/23/2022    Poliovirus vaccine, subcutaneous (IPOL) 1988, 02/28/1989, 04/25/1989, 11/24/1989    Tdap vaccine, age 7 year and older (BOOSTRIX, ADACEL) 08/29/2016         Review of Systems  All pertinent positive symptoms are included in the history of present illness.  All other systems have been reviewed and are negative and noncontributory to this patient's current ailments.     Objective   /70 (BP Location: Left arm, Patient Position: Sitting, BP Cuff Size: Adult)   Pulse 71   Ht 1.753 m (5' 9\")   Wt 74.8 kg (165 lb)   SpO2 99%   BMI 24.37 kg/m²   BSA: 1.91 meters squared  No visits with results within 1 Month(s) from this visit.   Latest known visit with results is:   Lab Requisition on 05/23/2024   Component Date Value Ref Range Status    Case Report 05/23/2024    Final                    Value:Surgical Pathology                                Case: [id number]                                  Authorizing Provider:                             Collected:           05/23/2024 1308              Ordering Location:     [facility]       Received:            05/28/2024 1514                                     Center                                                                       Pathologist:           Roxie Sidhu DO                                                   Specimens:   A) - DUODENAL BULB  BIOPSY, SECOND, COLD FORCEP, " "BIOPSY                                             B) - STOMACH ANTRUM BIOPSY, COLD FORCEP, BIOPSY                                            FINAL DIAGNOSIS 05/23/2024    Final                    Value:A. DUODENUM, BULB/SECOND PART,  BIOPSY:                           -- Small intestinal mucosa with no significant pathologic findings.                          --No microscopic evidence of celiac sprue.                                                    B. STOMACH, ANTRUM, BIOPSY:                           --Gastric mucosa with evidence of chemical/reactive gastropathy.                          --No morphologic evidence of Helicobacter pylori microorganisms.      05/23/2024    Final                    Value:By the signature on this report, the individual or group listed as making                           the Final Interpretation/Diagnosis certifies that they have reviewed this                           case.     Clinical History 05/23/2024    Final                    Value:Upper abdominal pain and heartburn.    Gross Description 05/23/2024    Final                    Value:A: Received in formalin, labeled with the patient's name and hospital                           number and \"duodenum-duodenal bulb, second part\", are multiple fragments                           of tan, soft tissue aggregating to 1.2 x 0.4 x 0.2 cm. The specimen is                           submitted in toto in one cassette.                          SMS                          B: Received in formalin, labeled with the patient's name and hospital                           number and \"stomach-body, antrum\", are multiple fragments of tan, soft                           tissue aggregating to 0.9 x 0.5 x 0.2 cm. The specimen is submitted in                           toto in one cassette.                          SMS       Physical Exam  CONSTITUTIONAL - well nourished, well developed, looks like stated age, in no acute distress, not ill-appearing, and " not tired appearing  SKIN - normal skin color and pigmentation, normal skin turgor without rash, lesions, or nodules visualized. 2 cm nevus on upper right  to midline with defined borders. No erythema, discoloration, warmth, or bleeding  HEAD - no trauma, normocephalic  EYES - pupils are equal and reactive to light, extraocular muscles are intact, and normal external exam  ENT -  uvula midline, normal tongue movement and throat normal, no exudate, nasal passage without discharge and patent  NECK - supple without rigidity, no neck mass was observed, no thyromegaly or thyroid nodules  CHEST - clear to auscultation, no wheezing, no crackles and no rales, good effort  CARDIAC - regular rate and regular rhythm, no skipped beats, no murmur  ABDOMEN - no organomegaly, soft, nontender, nondistended, normal bowel sounds, no guarding/rebound/rigidity, negative McBurney sign and negative Fierro sign  EXTREMITIES - no edema, no deformities  NEUROLOGICAL - normal gait, normal balance, normal motor, no ataxia, DTRs equal and symmetrical; alert, oriented and no focal signs  PSYCHIATRIC - alert, pleasant and cordial, age-appropriate  IMMUNOLOGIC - no cervical lymphadenopathy     Assessment/Plan   1. Encounter for preventive health examination   Complete history and physical examination was performed  EKG reveals bradycardia without acute changes  Requisition for CBC, CMP, TSH, lipid, A1c has been provided to you  Also requisition for hep C, HIV screening; hep B and varicella titers, as well as vitamins level check were provided today  We will notify of test results once available and make treatment recommendations accordingly  Educated about importance of conducting healthy lifestyle, follow a well-balanced diet and exercising regularly    2. Mood disorder/vitamin D deficiency  Reviewed improvement in Vitamin D levels from 34 to 64 on (6/1/2022).   Continue taking OTC Vitamin D in addition to calcium-enriched diet.   We  will recheck Vitamin D levels today and make further recommendations accordingly.  Revisited association of symptoms in relation to seasonal affective disorder.   We discussed a bright light during the winter, and potentially bupropion if desired when the winter months are coming up.      3. Deviated septum  Well managed and no current issues since ENT consult with Dr. Ajit Oviedo.   Please continue follow-up per ENT protocol for any changes or further assessment as needed.       4. Nevus /birthmark  Reviewed Derm (Dr. Valdez) progress note on 7/7/2022.   Continue monthly self-skin checks to monitor for any changes in moles and follow-up with Derm for any additional concerns.       5. Hyperlipidemia  Requisition for lipid panel was provided today.  We will review the results and make treatment recommendations accordingly  We strongly recommend to keep up the good work of following and low fat diet and exercise regurlarly     6. Cold hands and feet  This is most likely situational, related to working from your basement at home where it is cold.   No screening for autoimmune or other underlying etiologies at this time.   Please let me know if symptoms worsen or you notice skin color changes, numbness, or tingling.      7. Acid reflux/ Abdominal pain  Stable and well managed with diet and Famotidine 20 mg as needed.     8.  Kidney cyst  Stable, no current symptoms.  Referral for complete ultrasound of the kidney was provided today.  Please call to schedule appointment and earliest medical convenience.  Will review results and make treatment recommendations accordingly.    9.  Chest pressure  Chest pressure is less likely associated with angina pectoris at this moment.  Discussed in length about signs and symptoms of angina  To be fully thorough we provided a referral to cardiology for further evaluation and possible treatment.  Recommend to continue following well-balanced diet and exercising regularly.    Thank you  for letting us be a part of your care team.  Please call the office if you have further questions or concerns regarding your care    Otherwise, please follow-up in 6-12 months for continued care and refills as well as your yearly physical examination    Karan Salinas MD  PGY2,  Resident

## 2024-08-20 ENCOUNTER — APPOINTMENT (OUTPATIENT)
Dept: PRIMARY CARE | Facility: CLINIC | Age: 34
End: 2024-08-20
Payer: COMMERCIAL

## 2024-08-20 ENCOUNTER — LAB (OUTPATIENT)
Dept: LAB | Facility: LAB | Age: 34
End: 2024-08-20
Payer: COMMERCIAL

## 2024-08-20 VITALS
OXYGEN SATURATION: 99 % | HEART RATE: 71 BPM | BODY MASS INDEX: 24.44 KG/M2 | WEIGHT: 165 LBS | DIASTOLIC BLOOD PRESSURE: 70 MMHG | SYSTOLIC BLOOD PRESSURE: 110 MMHG | HEIGHT: 69 IN

## 2024-08-20 DIAGNOSIS — Z00.00 HEALTHCARE MAINTENANCE: Primary | ICD-10-CM

## 2024-08-20 DIAGNOSIS — E78.2 MIXED HYPERLIPIDEMIA: ICD-10-CM

## 2024-08-20 DIAGNOSIS — Z00.00 HEALTHCARE MAINTENANCE: ICD-10-CM

## 2024-08-20 DIAGNOSIS — N28.1 KIDNEY CYSTS: ICD-10-CM

## 2024-08-20 DIAGNOSIS — E55.9 VITAMIN D DEFICIENCY: ICD-10-CM

## 2024-08-20 DIAGNOSIS — F39 MOOD DISORDER (CMS-HCC): ICD-10-CM

## 2024-08-20 DIAGNOSIS — J34.2 DEVIATED SEPTUM: ICD-10-CM

## 2024-08-20 DIAGNOSIS — R07.89 CHEST PRESSURE: ICD-10-CM

## 2024-08-20 DIAGNOSIS — D22.9 NEVUS: ICD-10-CM

## 2024-08-20 LAB
25(OH)D3 SERPL-MCNC: 60 NG/ML (ref 30–100)
ALBUMIN SERPL BCP-MCNC: 4.7 G/DL (ref 3.4–5)
ALP SERPL-CCNC: 58 U/L (ref 33–120)
ALT SERPL W P-5'-P-CCNC: 24 U/L (ref 10–52)
ANION GAP SERPL CALC-SCNC: 13 MMOL/L (ref 10–20)
AST SERPL W P-5'-P-CCNC: 22 U/L (ref 9–39)
BASOPHILS # BLD AUTO: 0.04 X10*3/UL (ref 0–0.1)
BASOPHILS NFR BLD AUTO: 0.9 %
BILIRUB SERPL-MCNC: 0.8 MG/DL (ref 0–1.2)
BUN SERPL-MCNC: 23 MG/DL (ref 6–23)
CALCIUM SERPL-MCNC: 9.2 MG/DL (ref 8.6–10.3)
CHLORIDE SERPL-SCNC: 104 MMOL/L (ref 98–107)
CHOLEST SERPL-MCNC: 223 MG/DL (ref 0–199)
CHOLESTEROL/HDL RATIO: 3.9
CO2 SERPL-SCNC: 26 MMOL/L (ref 21–32)
CREAT SERPL-MCNC: 1.01 MG/DL (ref 0.5–1.3)
EGFRCR SERPLBLD CKD-EPI 2021: >90 ML/MIN/1.73M*2
EOSINOPHIL # BLD AUTO: 0.11 X10*3/UL (ref 0–0.7)
EOSINOPHIL NFR BLD AUTO: 2.5 %
ERYTHROCYTE [DISTWIDTH] IN BLOOD BY AUTOMATED COUNT: 12.9 % (ref 11.5–14.5)
FOLATE SERPL-MCNC: 17 NG/ML
GLUCOSE SERPL-MCNC: 88 MG/DL (ref 74–99)
HBV SURFACE AB SER-ACNC: 6.1 MIU/ML
HCT VFR BLD AUTO: 46.3 % (ref 41–52)
HCV AB SER QL: NONREACTIVE
HDLC SERPL-MCNC: 56.6 MG/DL
HGB BLD-MCNC: 15.1 G/DL (ref 13.5–17.5)
HIV 1+2 AB+HIV1 P24 AG SERPL QL IA: NONREACTIVE
IMM GRANULOCYTES # BLD AUTO: 0.01 X10*3/UL (ref 0–0.7)
IMM GRANULOCYTES NFR BLD AUTO: 0.2 % (ref 0–0.9)
LDLC SERPL CALC-MCNC: 157 MG/DL
LYMPHOCYTES # BLD AUTO: 1.66 X10*3/UL (ref 1.2–4.8)
LYMPHOCYTES NFR BLD AUTO: 38.3 %
MCH RBC QN AUTO: 31 PG (ref 26–34)
MCHC RBC AUTO-ENTMCNC: 32.6 G/DL (ref 32–36)
MCV RBC AUTO: 95 FL (ref 80–100)
MONOCYTES # BLD AUTO: 0.39 X10*3/UL (ref 0.1–1)
MONOCYTES NFR BLD AUTO: 9 %
NEUTROPHILS # BLD AUTO: 2.12 X10*3/UL (ref 1.2–7.7)
NEUTROPHILS NFR BLD AUTO: 49.1 %
NON HDL CHOLESTEROL: 166 MG/DL (ref 0–149)
NRBC BLD-RTO: 0 /100 WBCS (ref 0–0)
PLATELET # BLD AUTO: 223 X10*3/UL (ref 150–450)
POC APPEARANCE, URINE: CLEAR
POC BILIRUBIN, URINE: NEGATIVE
POC BLOOD, URINE: NEGATIVE
POC COLOR, URINE: YELLOW
POC GLUCOSE, URINE: NEGATIVE MG/DL
POC KETONES, URINE: NEGATIVE MG/DL
POC LEUKOCYTES, URINE: NEGATIVE
POC NITRITE,URINE: NEGATIVE
POC PH, URINE: 6 PH
POC PROTEIN, URINE: NEGATIVE MG/DL
POC SPECIFIC GRAVITY, URINE: 1.01
POC UROBILINOGEN, URINE: 0.2 EU/DL
POTASSIUM SERPL-SCNC: 3.9 MMOL/L (ref 3.5–5.3)
PROT SERPL-MCNC: 7.2 G/DL (ref 6.4–8.2)
RBC # BLD AUTO: 4.87 X10*6/UL (ref 4.5–5.9)
SODIUM SERPL-SCNC: 139 MMOL/L (ref 136–145)
TRIGL SERPL-MCNC: 47 MG/DL (ref 0–149)
TSH SERPL-ACNC: 1.07 MIU/L (ref 0.44–3.98)
VARICELLA ZOSTER IGG INDEX: 4.9 IA
VIT B12 SERPL-MCNC: 286 PG/ML (ref 211–911)
VLDL: 9 MG/DL (ref 0–40)
VZV IGG SER QL IA: POSITIVE
WBC # BLD AUTO: 4.3 X10*3/UL (ref 4.4–11.3)

## 2024-08-20 PROCEDURE — 86706 HEP B SURFACE ANTIBODY: CPT

## 2024-08-20 PROCEDURE — 86803 HEPATITIS C AB TEST: CPT

## 2024-08-20 PROCEDURE — 83036 HEMOGLOBIN GLYCOSYLATED A1C: CPT

## 2024-08-20 PROCEDURE — 93000 ELECTROCARDIOGRAM COMPLETE: CPT | Performed by: STUDENT IN AN ORGANIZED HEALTH CARE EDUCATION/TRAINING PROGRAM

## 2024-08-20 PROCEDURE — 84207 ASSAY OF VITAMIN B-6: CPT

## 2024-08-20 PROCEDURE — 99214 OFFICE O/P EST MOD 30 MIN: CPT | Performed by: STUDENT IN AN ORGANIZED HEALTH CARE EDUCATION/TRAINING PROGRAM

## 2024-08-20 PROCEDURE — 36415 COLL VENOUS BLD VENIPUNCTURE: CPT

## 2024-08-20 PROCEDURE — 85025 COMPLETE CBC W/AUTO DIFF WBC: CPT

## 2024-08-20 PROCEDURE — 84425 ASSAY OF VITAMIN B-1: CPT

## 2024-08-20 PROCEDURE — 86787 VARICELLA-ZOSTER ANTIBODY: CPT

## 2024-08-20 PROCEDURE — 99395 PREV VISIT EST AGE 18-39: CPT | Performed by: STUDENT IN AN ORGANIZED HEALTH CARE EDUCATION/TRAINING PROGRAM

## 2024-08-20 PROCEDURE — 81002 URINALYSIS NONAUTO W/O SCOPE: CPT | Performed by: STUDENT IN AN ORGANIZED HEALTH CARE EDUCATION/TRAINING PROGRAM

## 2024-08-20 PROCEDURE — 84597 ASSAY OF VITAMIN K: CPT

## 2024-08-20 PROCEDURE — 82607 VITAMIN B-12: CPT

## 2024-08-20 PROCEDURE — 80053 COMPREHEN METABOLIC PANEL: CPT

## 2024-08-20 PROCEDURE — 87389 HIV-1 AG W/HIV-1&-2 AB AG IA: CPT

## 2024-08-20 PROCEDURE — 82746 ASSAY OF FOLIC ACID SERUM: CPT

## 2024-08-20 PROCEDURE — 82306 VITAMIN D 25 HYDROXY: CPT

## 2024-08-20 PROCEDURE — 80061 LIPID PANEL: CPT

## 2024-08-20 PROCEDURE — 3008F BODY MASS INDEX DOCD: CPT | Performed by: STUDENT IN AN ORGANIZED HEALTH CARE EDUCATION/TRAINING PROGRAM

## 2024-08-20 PROCEDURE — 84443 ASSAY THYROID STIM HORMONE: CPT

## 2024-08-20 PROCEDURE — 1036F TOBACCO NON-USER: CPT | Performed by: STUDENT IN AN ORGANIZED HEALTH CARE EDUCATION/TRAINING PROGRAM

## 2024-08-20 ASSESSMENT — PATIENT HEALTH QUESTIONNAIRE - PHQ9
2. FEELING DOWN, DEPRESSED OR HOPELESS: NOT AT ALL
SUM OF ALL RESPONSES TO PHQ9 QUESTIONS 1 AND 2: 0
1. LITTLE INTEREST OR PLEASURE IN DOING THINGS: NOT AT ALL

## 2024-08-21 LAB
EST. AVERAGE GLUCOSE BLD GHB EST-MCNC: 111 MG/DL
HBA1C MFR BLD: 5.5 %

## 2024-08-21 NOTE — RESULT ENCOUNTER NOTE
Varicella is positive noting that he does have immunity to chickenpox    Cholesterol continues to be slightly elevated to 23, HDL 56, , triglycerides 47    Complete blood cell count shows slightly lower white blood cell count but differential is within normal limits so this is of no concern    Hepatitis C and HIV are both negative    Hepatitis B is nonreactive so I would recommend he considers doing his hepatitis B vaccines    To my knowledge we do not have the adult dose here in the office so I would recommend that he goes to his pharmacy to get the 3 shot vaccine series    Sugar, kidneys, liver, electrolytes are all within normal limits    Vitamin D within normal limits    B12 within normal limits alongside a normal folate    Thyroid well within normal limits    We are just waiting for a few more labs at this time

## 2024-08-22 ENCOUNTER — OFFICE VISIT (OUTPATIENT)
Dept: CARDIOLOGY | Facility: CLINIC | Age: 34
End: 2024-08-22
Payer: COMMERCIAL

## 2024-08-22 ENCOUNTER — HOSPITAL ENCOUNTER (OUTPATIENT)
Dept: RADIOLOGY | Facility: CLINIC | Age: 34
Discharge: HOME | End: 2024-08-22
Payer: COMMERCIAL

## 2024-08-22 VITALS
SYSTOLIC BLOOD PRESSURE: 121 MMHG | WEIGHT: 160 LBS | HEIGHT: 70 IN | BODY MASS INDEX: 22.9 KG/M2 | HEART RATE: 68 BPM | DIASTOLIC BLOOD PRESSURE: 69 MMHG

## 2024-08-22 DIAGNOSIS — R07.89 CHEST PRESSURE: ICD-10-CM

## 2024-08-22 DIAGNOSIS — E78.00 ELEVATED LDL CHOLESTEROL LEVEL: Primary | ICD-10-CM

## 2024-08-22 DIAGNOSIS — N28.1 KIDNEY CYSTS: ICD-10-CM

## 2024-08-22 PROCEDURE — 76770 US EXAM ABDO BACK WALL COMP: CPT | Performed by: STUDENT IN AN ORGANIZED HEALTH CARE EDUCATION/TRAINING PROGRAM

## 2024-08-22 PROCEDURE — 1036F TOBACCO NON-USER: CPT | Performed by: INTERNAL MEDICINE

## 2024-08-22 PROCEDURE — 99204 OFFICE O/P NEW MOD 45 MIN: CPT | Performed by: INTERNAL MEDICINE

## 2024-08-22 PROCEDURE — 3008F BODY MASS INDEX DOCD: CPT | Performed by: INTERNAL MEDICINE

## 2024-08-22 PROCEDURE — 99214 OFFICE O/P EST MOD 30 MIN: CPT | Performed by: INTERNAL MEDICINE

## 2024-08-22 PROCEDURE — 76770 US EXAM ABDO BACK WALL COMP: CPT

## 2024-08-22 NOTE — PROGRESS NOTES
Chief Complaint:   Chest Pain     History of Present Illness     Jose Richards is a 33 y.o. male presenting with chest pain.  The patient complains that for the past few months , they have experienced non-radiating right parasternal chest pressure when he runs that lasts 2 mins despite continued running. The discomfort is exacerbated by running and relieved by none.  The discomfort is not changing.  The patient does not experience associated shortness of breath, nausea, vomiting or diaphoresis.  The pain is not positional and is not reproduced by palpation.  The patient has no history of known coronary artery disease.  The patient has not had a stress test within the last 12 months and has never had cardiac catheterization.  The patient exercises and does not experience chest discomfort with exertion.  There is no history of aortic aneurysm or thromboembolism.  The patient denies any systemic complaints including fever. Risk factors: HPL, gender.  No FHX. Non-smoker. No HTN or DM.    Review of Systems  All pertinent systems have been reviewed and are negative except for what is stated in the history of present illness.    All other systems have been reviewed and are negative and noncontributory to this patient's current ailments.   .       Previous History     Past Medical History:  He has a past medical history of Chest pressure (08/22/2024).    Past Surgical History:  He has no past surgical history on file.      Social History:  He reports that he has never smoked. He has never used smokeless tobacco. He reports that he does not currently use alcohol. He reports that he does not use drugs.    Family History:  Family History   Problem Relation Name Age of Onset    Hypertension Mother      Hyperlipidemia Mother      Hypertension Father      Hyperlipidemia Father          Allergies:  Patient has no known allergies.    Outpatient Medications:  No current outpatient medications    Physical Examination  "  Vitals:  Visit Vitals  /69 (BP Location: Right arm, Patient Position: Sitting, BP Cuff Size: Adult)   Pulse 68   Ht 1.778 m (5' 10\")   Wt 72.6 kg (160 lb)   BMI 22.96 kg/m²   Smoking Status Never   BSA 1.89 m²    Physical Exam  Vitals reviewed.   Constitutional:       General: He is not in acute distress.     Appearance: Normal appearance.   HENT:      Head: Normocephalic and atraumatic.      Nose: Nose normal.   Eyes:      Conjunctiva/sclera: Conjunctivae normal.   Cardiovascular:      Rate and Rhythm: Normal rate and regular rhythm.      Pulses: Normal pulses.      Heart sounds: No murmur heard.  Pulmonary:      Effort: Pulmonary effort is normal. No respiratory distress.      Breath sounds: Normal breath sounds. No wheezing, rhonchi or rales.   Abdominal:      General: Bowel sounds are normal. There is no distension.      Palpations: Abdomen is soft.      Tenderness: There is no abdominal tenderness.   Musculoskeletal:         General: No swelling.      Right lower leg: No edema.      Left lower leg: No edema.   Skin:     General: Skin is warm and dry.      Capillary Refill: Capillary refill takes less than 2 seconds.   Neurological:      General: No focal deficit present.      Mental Status: He is alert.   Psychiatric:         Mood and Affect: Mood normal.             Labs/Imaging/Cardiac Studies     Last Labs:  CBC -  Lab Results   Component Value Date    WBC 4.3 (L) 08/20/2024    HGB 15.1 08/20/2024    HCT 46.3 08/20/2024    MCV 95 08/20/2024     08/20/2024       CMP -  Lab Results   Component Value Date    CALCIUM 9.2 08/20/2024    PROT 7.2 08/20/2024    ALBUMIN 4.7 08/20/2024    AST 22 08/20/2024    ALT 24 08/20/2024    ALKPHOS 58 08/20/2024    BILITOT 0.8 08/20/2024       LIPID PANEL -   Lab Results   Component Value Date    CHOL 223 (H) 08/20/2024    HDL 56.6 08/20/2024    CHHDL 3.9 08/20/2024    VLDL 9 08/20/2024    TRIG 47 08/20/2024    NHDL 166 (H) 08/20/2024       RENAL FUNCTION PANEL - "   Lab Results   Component Value Date    K 3.9 08/20/2024       Lab Results   Component Value Date    HGBA1C 5.5 08/20/2024       ECG:    Echo:  No echocardiogram results found for the past 12 months       Assessment and Recommendations     Assessment/Plan       1. Chest pressure  The patient presents with atypical anginal chest pain.  The ECG is non-ischemic.  The differential diagnosis includes CAD, gastrointestinal, pulmonary, and musculoskeletal etiologies.  There is no clinical evidence to suggest acute coronary syndrome, aortic dissection, or pulmonary embolism.  The ECG shows no evidence of ischemia.  An outpatient evaluation of the patient's chest pain is appropriate with a stress test which will be scheduled as soon as can be arranged. For severe and/or prolonged chest pain, the patient was instructed to call [phone].     - Referral to Cardiology    2. Elevated LDL cholesterol level  No statin for now.         Tolu Burns MD    Exclusive of any other services or procedures performed, I, Tolu Burns MD , spent 30 minutes in duration for this visit today.  This time consisted of chart review, obtaining history, and/or performing the exam as documented above as well as documenting the clinical information for the encounter in the electronic record, discussing treatment options, plans, and/or goals with patient, family, and/or caregiver, refilling medications, updating the electronic record, ordering medicines, lab work, imaging, referrals, and/or procedures as documented above and communicating with other [facility] professionals. I have discussed the results of laboratory, radiology, and cardiology studies with the patient and their family/caregiver.

## 2024-08-24 LAB — PHYTONADIONE SERPL-MCNC: 0.72 NMOL/L (ref 0.22–4.88)

## 2024-08-25 LAB — VIT B1 PYROPHOSHATE BLD-SCNC: 128 NMOL/L (ref 70–180)

## 2024-08-25 NOTE — RESULT ENCOUNTER NOTE
Ultrasound of the kidney shows a stable right subcentimeter simple cyst    Is all within normal limits otherwise

## 2024-08-26 LAB — PYRIDOXAL PHOS SERPL-SCNC: 600.2 NMOL/L (ref 20–125)

## 2024-08-26 NOTE — RESULT ENCOUNTER NOTE
Vitamin B6 is elevated at 600s this is most likely due to supplementation if he is taking any    We will repeat this in 6 months

## 2024-08-28 ENCOUNTER — TELEPHONE (OUTPATIENT)
Dept: CARDIOLOGY | Facility: CLINIC | Age: 34
End: 2024-08-28
Payer: COMMERCIAL

## 2024-08-30 ENCOUNTER — HOSPITAL ENCOUNTER (OUTPATIENT)
Dept: CARDIOLOGY | Facility: HOSPITAL | Age: 34
Discharge: HOME | End: 2024-08-30
Payer: COMMERCIAL

## 2024-08-30 DIAGNOSIS — E78.00 ELEVATED LDL CHOLESTEROL LEVEL: ICD-10-CM

## 2024-08-30 DIAGNOSIS — R07.89 CHEST PRESSURE: ICD-10-CM

## 2024-08-30 PROCEDURE — 93325 DOPPLER ECHO COLOR FLOW MAPG: CPT

## 2025-08-15 ENCOUNTER — APPOINTMENT (OUTPATIENT)
Dept: PRIMARY CARE | Facility: CLINIC | Age: 35
End: 2025-08-15
Payer: COMMERCIAL

## 2025-08-15 VITALS
WEIGHT: 178 LBS | OXYGEN SATURATION: 97 % | BODY MASS INDEX: 26.36 KG/M2 | SYSTOLIC BLOOD PRESSURE: 122 MMHG | DIASTOLIC BLOOD PRESSURE: 80 MMHG | HEART RATE: 54 BPM | HEIGHT: 69 IN

## 2025-08-15 DIAGNOSIS — E55.9 VITAMIN D DEFICIENCY: ICD-10-CM

## 2025-08-15 DIAGNOSIS — K21.9 GASTROESOPHAGEAL REFLUX DISEASE WITHOUT ESOPHAGITIS: ICD-10-CM

## 2025-08-15 DIAGNOSIS — Z76.89 ESTABLISHING CARE WITH NEW DOCTOR, ENCOUNTER FOR: Primary | ICD-10-CM

## 2025-08-15 DIAGNOSIS — E78.00 ELEVATED LDL CHOLESTEROL LEVEL: ICD-10-CM

## 2025-08-15 DIAGNOSIS — Z12.5 PROSTATE CANCER SCREENING: ICD-10-CM

## 2025-08-15 DIAGNOSIS — Z00.00 ROUTINE ADULT HEALTH MAINTENANCE: ICD-10-CM

## 2025-08-15 PROBLEM — J34.3 HYPERTROPHY OF INFERIOR NASAL TURBINATE: Status: ACTIVE | Noted: 2025-08-15

## 2025-08-15 PROBLEM — R07.89 CHEST PRESSURE: Status: RESOLVED | Noted: 2024-08-22 | Resolved: 2025-08-15

## 2025-08-15 PROBLEM — I86.1 VARICOCELE: Status: ACTIVE | Noted: 2025-08-15

## 2025-08-15 PROBLEM — M54.14 RADICULAR PAIN OF THORACIC REGION: Status: ACTIVE | Noted: 2025-08-15

## 2025-08-15 PROBLEM — N43.3 HYDROCELE, LEFT: Status: ACTIVE | Noted: 2025-08-15

## 2025-08-15 PROCEDURE — 99395 PREV VISIT EST AGE 18-39: CPT

## 2025-08-15 PROCEDURE — 3008F BODY MASS INDEX DOCD: CPT

## 2025-08-15 PROCEDURE — 1036F TOBACCO NON-USER: CPT

## 2025-08-15 SDOH — HEALTH STABILITY: PHYSICAL HEALTH: ON AVERAGE, HOW MANY DAYS PER WEEK DO YOU ENGAGE IN MODERATE TO STRENUOUS EXERCISE (LIKE A BRISK WALK)?: 5 DAYS

## 2025-08-15 SDOH — HEALTH STABILITY: PHYSICAL HEALTH: ON AVERAGE, HOW MANY MINUTES DO YOU ENGAGE IN EXERCISE AT THIS LEVEL?: 50 MIN

## 2025-08-15 ASSESSMENT — PATIENT HEALTH QUESTIONNAIRE - PHQ9
4. FEELING TIRED OR HAVING LITTLE ENERGY: SEVERAL DAYS
9. THOUGHTS THAT YOU WOULD BE BETTER OFF DEAD, OR OF HURTING YOURSELF: NOT AT ALL
5. POOR APPETITE OR OVEREATING: NOT AT ALL
SUM OF ALL RESPONSES TO PHQ9 QUESTIONS 1 AND 2: 1
8. MOVING OR SPEAKING SO SLOWLY THAT OTHER PEOPLE COULD HAVE NOTICED. OR THE OPPOSITE, BEING SO FIGETY OR RESTLESS THAT YOU HAVE BEEN MOVING AROUND A LOT MORE THAN USUAL: NOT AT ALL
1. LITTLE INTEREST OR PLEASURE IN DOING THINGS: SEVERAL DAYS
6. FEELING BAD ABOUT YOURSELF - OR THAT YOU ARE A FAILURE OR HAVE LET YOURSELF OR YOUR FAMILY DOWN: NOT AT ALL
2. FEELING DOWN, DEPRESSED OR HOPELESS: NOT AT ALL
SUM OF ALL RESPONSES TO PHQ QUESTIONS 1-9: 3
3. TROUBLE FALLING OR STAYING ASLEEP OR SLEEPING TOO MUCH: SEVERAL DAYS
7. TROUBLE CONCENTRATING ON THINGS, SUCH AS READING THE NEWSPAPER OR WATCHING TELEVISION: NOT AT ALL

## 2025-08-15 ASSESSMENT — LIFESTYLE VARIABLES
HOW OFTEN DO YOU HAVE SIX OR MORE DRINKS ON ONE OCCASION: NEVER
HOW OFTEN DO YOU HAVE A DRINK CONTAINING ALCOHOL: MONTHLY OR LESS
SKIP TO QUESTIONS 9-10: 1
HOW MANY STANDARD DRINKS CONTAINING ALCOHOL DO YOU HAVE ON A TYPICAL DAY: PATIENT DOES NOT DRINK
AUDIT-C TOTAL SCORE: 1

## 2025-08-15 ASSESSMENT — ANXIETY QUESTIONNAIRES
1. FEELING NERVOUS, ANXIOUS, OR ON EDGE: SEVERAL DAYS
7. FEELING AFRAID AS IF SOMETHING AWFUL MIGHT HAPPEN: NOT AT ALL
2. NOT BEING ABLE TO STOP OR CONTROL WORRYING: NOT AT ALL
IF YOU CHECKED OFF ANY PROBLEMS ON THIS QUESTIONNAIRE, HOW DIFFICULT HAVE THESE PROBLEMS MADE IT FOR YOU TO DO YOUR WORK, TAKE CARE OF THINGS AT HOME, OR GET ALONG WITH OTHER PEOPLE: NOT DIFFICULT AT ALL
5. BEING SO RESTLESS THAT IT IS HARD TO SIT STILL: NOT AT ALL
6. BECOMING EASILY ANNOYED OR IRRITABLE: SEVERAL DAYS
3. WORRYING TOO MUCH ABOUT DIFFERENT THINGS: NOT AT ALL
4. TROUBLE RELAXING: NOT AT ALL
GAD7 TOTAL SCORE: 2

## 2025-08-19 LAB
25(OH)D3+25(OH)D2 SERPL-MCNC: 83 NG/ML (ref 30–100)
ALBUMIN SERPL-MCNC: 4.6 G/DL (ref 3.6–5.1)
ALP SERPL-CCNC: 50 U/L (ref 36–130)
ALT SERPL-CCNC: 17 U/L (ref 9–46)
ANION GAP SERPL CALCULATED.4IONS-SCNC: 8 MMOL/L (CALC) (ref 7–17)
AST SERPL-CCNC: 15 U/L (ref 10–40)
BASOPHILS # BLD AUTO: 29 CELLS/UL (ref 0–200)
BASOPHILS NFR BLD AUTO: 0.7 %
BILIRUB SERPL-MCNC: 0.5 MG/DL (ref 0.2–1.2)
BUN SERPL-MCNC: 22 MG/DL (ref 7–25)
CALCIUM SERPL-MCNC: 9 MG/DL (ref 8.6–10.3)
CHLORIDE SERPL-SCNC: 103 MMOL/L (ref 98–110)
CHOLEST SERPL-MCNC: 236 MG/DL
CHOLEST/HDLC SERPL: 4.3 (CALC)
CO2 SERPL-SCNC: 29 MMOL/L (ref 20–32)
CREAT SERPL-MCNC: 1.06 MG/DL (ref 0.6–1.26)
EGFRCR SERPLBLD CKD-EPI 2021: 93 ML/MIN/1.73M2
EOSINOPHIL # BLD AUTO: 111 CELLS/UL (ref 15–500)
EOSINOPHIL NFR BLD AUTO: 2.7 %
ERYTHROCYTE [DISTWIDTH] IN BLOOD BY AUTOMATED COUNT: 13 % (ref 11–15)
EST. AVERAGE GLUCOSE BLD GHB EST-MCNC: 105 MG/DL
EST. AVERAGE GLUCOSE BLD GHB EST-SCNC: 5.8 MMOL/L
GLUCOSE SERPL-MCNC: 85 MG/DL (ref 65–99)
HBA1C MFR BLD: 5.3 %
HCT VFR BLD AUTO: 47 % (ref 38.5–50)
HDLC SERPL-MCNC: 55 MG/DL
HGB BLD-MCNC: 15.2 G/DL (ref 13.2–17.1)
LDLC SERPL CALC-MCNC: 165 MG/DL (CALC)
LYMPHOCYTES # BLD AUTO: 1931 CELLS/UL (ref 850–3900)
LYMPHOCYTES NFR BLD AUTO: 47.1 %
MCH RBC QN AUTO: 31.4 PG (ref 27–33)
MCHC RBC AUTO-ENTMCNC: 32.3 G/DL (ref 32–36)
MCV RBC AUTO: 97.1 FL (ref 80–100)
MONOCYTES # BLD AUTO: 299 CELLS/UL (ref 200–950)
MONOCYTES NFR BLD AUTO: 7.3 %
NEUTROPHILS # BLD AUTO: 1730 CELLS/UL (ref 1500–7800)
NEUTROPHILS NFR BLD AUTO: 42.2 %
NONHDLC SERPL-MCNC: 181 MG/DL (CALC)
PLATELET # BLD AUTO: 202 THOUSAND/UL (ref 140–400)
PMV BLD REES-ECKER: 9.2 FL (ref 7.5–12.5)
POTASSIUM SERPL-SCNC: 4.4 MMOL/L (ref 3.5–5.3)
PROT SERPL-MCNC: 6.7 G/DL (ref 6.1–8.1)
RBC # BLD AUTO: 4.84 MILLION/UL (ref 4.2–5.8)
SODIUM SERPL-SCNC: 140 MMOL/L (ref 135–146)
TRIGL SERPL-MCNC: 69 MG/DL
TSH SERPL-ACNC: 1.85 MIU/L (ref 0.4–4.5)
WBC # BLD AUTO: 4.1 THOUSAND/UL (ref 3.8–10.8)

## 2026-08-03 ENCOUNTER — APPOINTMENT (OUTPATIENT)
Dept: PRIMARY CARE | Facility: CLINIC | Age: 36
End: 2026-08-03
Payer: COMMERCIAL